# Patient Record
Sex: MALE | HISPANIC OR LATINO | Employment: FULL TIME | URBAN - METROPOLITAN AREA
[De-identification: names, ages, dates, MRNs, and addresses within clinical notes are randomized per-mention and may not be internally consistent; named-entity substitution may affect disease eponyms.]

---

## 2020-12-09 RX ORDER — LOVASTATIN 20 MG/1
20 TABLET ORAL NIGHTLY
Qty: 30 TABLET | Refills: 1 | Status: SHIPPED | OUTPATIENT
Start: 2020-12-09 | End: 2021-03-09 | Stop reason: SDUPTHER

## 2020-12-09 RX ORDER — LISINOPRIL 2.5 MG/1
2.5 TABLET ORAL DAILY
Qty: 30 TABLET | Refills: 1 | Status: SHIPPED | OUTPATIENT
Start: 2020-12-09 | End: 2021-03-09 | Stop reason: SDUPTHER

## 2020-12-09 RX ORDER — CANAGLIFLOZIN 300 MG/1
1 TABLET, FILM COATED ORAL DAILY
COMMUNITY
Start: 2020-10-18 | End: 2020-12-09 | Stop reason: SDUPTHER

## 2020-12-09 RX ORDER — CANAGLIFLOZIN 300 MG/1
1 TABLET, FILM COATED ORAL DAILY
Qty: 30 TABLET | Refills: 1 | Status: SHIPPED | OUTPATIENT
Start: 2020-12-09 | End: 2021-03-09 | Stop reason: SDUPTHER

## 2020-12-09 RX ORDER — GLIPIZIDE 5 MG/1
5 TABLET ORAL 2 TIMES DAILY
Qty: 60 TABLET | Refills: 1 | Status: SHIPPED | OUTPATIENT
Start: 2020-12-09 | End: 2021-03-09 | Stop reason: SDUPTHER

## 2020-12-09 NOTE — TELEPHONE ENCOUNTER
PT'S WIFE CALLED REQUESTING THAT WE SEND IN LOSINOPRIL, LOVASTSTIN, GLIPIZIDE, METFORMIN, INVOKANA IN TO [facility] [address] IN [place]. PLEASE AND THANK YOU

## 2021-03-05 RX ORDER — LOVASTATIN 20 MG/1
TABLET ORAL
Qty: 30 TABLET | Refills: 0 | OUTPATIENT
Start: 2021-03-05

## 2021-03-05 RX ORDER — LISINOPRIL 2.5 MG/1
TABLET ORAL
Qty: 30 TABLET | Refills: 0 | OUTPATIENT
Start: 2021-03-05

## 2021-03-05 RX ORDER — GLIPIZIDE 5 MG/1
TABLET ORAL
Qty: 60 TABLET | Refills: 0 | OUTPATIENT
Start: 2021-03-05

## 2021-03-09 RX ORDER — LISINOPRIL 2.5 MG/1
2.5 TABLET ORAL DAILY
Qty: 30 TABLET | Refills: 1 | Status: SHIPPED | OUTPATIENT
Start: 2021-03-09 | End: 2021-05-17 | Stop reason: SDUPTHER

## 2021-03-09 RX ORDER — GLIPIZIDE 5 MG/1
5 TABLET ORAL 2 TIMES DAILY
Qty: 60 TABLET | Refills: 1 | Status: SHIPPED | OUTPATIENT
Start: 2021-03-09 | End: 2021-05-17 | Stop reason: SDUPTHER

## 2021-03-09 RX ORDER — LOVASTATIN 20 MG/1
20 TABLET ORAL NIGHTLY
Qty: 30 TABLET | Refills: 1 | Status: SHIPPED | OUTPATIENT
Start: 2021-03-09 | End: 2021-05-17 | Stop reason: SDUPTHER

## 2021-03-09 RX ORDER — CANAGLIFLOZIN 300 MG/1
1 TABLET, FILM COATED ORAL DAILY
Qty: 30 TABLET | Refills: 1 | Status: SHIPPED | OUTPATIENT
Start: 2021-03-09 | End: 2021-05-17 | Stop reason: SDUPTHER

## 2021-05-17 ENCOUNTER — OFFICE VISIT (OUTPATIENT)
Dept: ENDOCRINOLOGY | Facility: CLINIC | Age: 46
End: 2021-05-17

## 2021-05-17 ENCOUNTER — LAB (OUTPATIENT)
Dept: LAB | Facility: HOSPITAL | Age: 46
End: 2021-05-17

## 2021-05-17 VITALS
HEIGHT: 66 IN | WEIGHT: 189.8 LBS | DIASTOLIC BLOOD PRESSURE: 70 MMHG | BODY MASS INDEX: 30.5 KG/M2 | TEMPERATURE: 97.1 F | SYSTOLIC BLOOD PRESSURE: 120 MMHG | OXYGEN SATURATION: 100 % | HEART RATE: 57 BPM

## 2021-05-17 DIAGNOSIS — R74.8 ELEVATED LIVER ENZYMES: ICD-10-CM

## 2021-05-17 DIAGNOSIS — E78.2 MIXED HYPERLIPIDEMIA: ICD-10-CM

## 2021-05-17 DIAGNOSIS — E11.9 TYPE 2 DIABETES MELLITUS WITHOUT COMPLICATION, WITHOUT LONG-TERM CURRENT USE OF INSULIN (HCC): Primary | ICD-10-CM

## 2021-05-17 LAB
EXPIRATION DATE: NORMAL
HBA1C MFR BLD: 7 %
Lab: NORMAL

## 2021-05-17 PROCEDURE — 83036 HEMOGLOBIN GLYCOSYLATED A1C: CPT | Performed by: PHYSICIAN ASSISTANT

## 2021-05-17 PROCEDURE — 99214 OFFICE O/P EST MOD 30 MIN: CPT | Performed by: PHYSICIAN ASSISTANT

## 2021-05-17 RX ORDER — BLOOD SUGAR DIAGNOSTIC
STRIP MISCELLANEOUS
Qty: 100 EACH | Refills: 3 | Status: SHIPPED | OUTPATIENT
Start: 2021-05-17 | End: 2022-05-16 | Stop reason: SDUPTHER

## 2021-05-17 RX ORDER — LISINOPRIL 2.5 MG/1
2.5 TABLET ORAL DAILY
Qty: 90 TABLET | Refills: 2 | Status: SHIPPED | OUTPATIENT
Start: 2021-05-17 | End: 2021-11-18 | Stop reason: SDUPTHER

## 2021-05-17 RX ORDER — GLIPIZIDE 5 MG/1
5 TABLET ORAL 2 TIMES DAILY
Qty: 180 TABLET | Refills: 2 | Status: SHIPPED | OUTPATIENT
Start: 2021-05-17 | End: 2021-11-18 | Stop reason: SDUPTHER

## 2021-05-17 RX ORDER — CANAGLIFLOZIN 300 MG/1
1 TABLET, FILM COATED ORAL DAILY
Qty: 90 TABLET | Refills: 2 | Status: SHIPPED | OUTPATIENT
Start: 2021-05-17 | End: 2021-11-18 | Stop reason: SDUPTHER

## 2021-05-17 RX ORDER — LOVASTATIN 20 MG/1
20 TABLET ORAL NIGHTLY
Qty: 90 TABLET | Refills: 2 | Status: SHIPPED | OUTPATIENT
Start: 2021-05-17 | End: 2021-11-18 | Stop reason: SDUPTHER

## 2021-05-17 NOTE — PROGRESS NOTES
"     Office Note      Date: 2021  Patient Name: Thony Lilly  MRN: [id number]  : 1975    Chief Complaint   Patient presents with   • Diabetes       History of Present Illness:   Thony Lilly is a 45 y.o. male who presents for type 2 diabetes.  He has been over a year since his last appointment.  He reports he has been staying home due to the pandemic.  He remains on Invokana 300 mg daily, glipizide 5 mg twice a day and Metformin at 1000 mg twice a day.  He checks his blood sugar a few times a week.  He reports he has had a few low blood sugars but none severe and these typically occur if he skips a meal.  He reports he is due for his eye exam this summer.  He had both doses of his Covid vaccine.  He denies any trouble with his feet today.  He is fasting today and is due for labs.  He remains on lovastatin 20 mg daily and lisinopril 2.5 mg daily.      Subjective     Review of Systems:   Review of Systems   Constitutional: Negative.    Cardiovascular: Negative.    Gastrointestinal: Negative.    Endocrine: Negative.    Neurological: Negative.        The following portions of the patient's history were reviewed and updated as appropriate: allergies, current medications, past family history, past medical history, past social history, past surgical history and problem list.    Objective     Vitals:    21 1025   BP: 120/70   Pulse: 57   Temp: 97.1 °F (36.2 °C)   TempSrc: Infrared   SpO2: 100%   Weight: 86.1 kg (189 lb 12.8 oz)   Height: 167.6 cm (66\")   PainSc: 0-No pain     Body mass index is 30.63 kg/m².    Physical Exam  Vitals reviewed.   Constitutional:       General: He is not in acute distress.     Appearance: Normal appearance.   Cardiovascular:      Pulses:           Dorsalis pedis pulses are 2+ on the right side and 2+ on the left side.        Posterior tibial pulses are 2+ on the right side and 2+ on the left side.   Musculoskeletal:      Right foot: No deformity.      Left foot: No " deformity.   Feet:      Right foot:      Protective Sensation: 5 sites tested. 5 sites sensed.      Skin integrity: Skin integrity normal.      Toenail Condition: Right toenails are normal.      Left foot:      Protective Sensation: 5 sites tested. 5 sites sensed.      Skin integrity: Skin integrity normal.      Toenail Condition: Left toenails are normal.      Comments: Diabetic Foot Exam Performed and Monofilament Test Performed    Neurological:      Mental Status: He is alert.         HEMOGLOBIN A1C  Lab Results   Component Value Date    HGBA1C 7.0 05/17/2021         Assessment / Plan      Assessment & Plan:  1. Type 2 diabetes mellitus without complication, without long-term current use of insulin (CMS/formerly [facility])  A1c today was 7.0%.  This is to goal.  He will continue his Invokana 300 mg daily, glipizide 5 mg twice a day and Metformin at 1000 mg twice a day.  I refilled these medications today.  Encouraged healthy eating habits and physical activity as tolerated.  His blood pressure is okay today.  His weight is down 4 pounds since we last saw him September 2019.  Encouraged him to get his eye appointment scheduled for the summer.  His foot exam was okay today.  Fasting labs pending.  Will send note with results and plan.  Patient to call as needed  - POC Glycosylated Hemoglobin (Hb A1C)  - Comprehensive Metabolic Panel; Future  - Lipid Panel; Future  - Microalbumin / Creatinine Urine Ratio - Urine, Clean Catch; Future  - TSH; Future  - TSH  - Microalbumin / Creatinine Urine Ratio - Urine, Clean Catch  - Lipid Panel  - Comprehensive Metabolic Panel    2. Mixed hyperlipidemia  Fasting labs pending.  Will send note with results and plan.  For now he will continue lovastatin 20 mg daily.  I have refilled this prescription.  - Lipid Panel; Future  - Lipid Panel    3. Elevated liver enzymes  He has a history of elevated liver enzymes and was seen by GI in the past and was diagnosed with fatty liver.  Will send note with  results and plan.  Encouraged healthy eating habits and physical activity as tolerated.  - Comprehensive Metabolic Panel; Future  - Comprehensive Metabolic Panel      Return in about 6 months (around 11/17/2021) for Recheck.     Katiana Sainz PA-C  05/17/2021

## 2021-05-18 LAB
ALBUMIN SERPL-MCNC: 4.8 G/DL (ref 4–5)
ALBUMIN/CREAT UR: 12 MG/G CREAT (ref 0–29)
ALBUMIN/GLOB SERPL: 1.7 {RATIO} (ref 1.2–2.2)
ALP SERPL-CCNC: 89 IU/L (ref 48–121)
ALT SERPL-CCNC: 61 IU/L (ref 0–44)
AST SERPL-CCNC: 31 IU/L (ref 0–40)
BILIRUB SERPL-MCNC: 0.3 MG/DL (ref 0–1.2)
BUN SERPL-MCNC: 24 MG/DL (ref 6–24)
BUN/CREAT SERPL: 34 (ref 9–20)
CALCIUM SERPL-MCNC: 9.4 MG/DL (ref 8.7–10.2)
CHLORIDE SERPL-SCNC: 105 MMOL/L (ref 96–106)
CHOLEST SERPL-MCNC: 126 MG/DL (ref 100–199)
CO2 SERPL-SCNC: 23 MMOL/L (ref 20–29)
CREAT SERPL-MCNC: 0.71 MG/DL (ref 0.76–1.27)
CREAT UR-MCNC: 166.6 MG/DL
GLOBULIN SER CALC-MCNC: 2.8 G/DL (ref 1.5–4.5)
GLUCOSE SERPL-MCNC: ABNORMAL MG/DL
HDLC SERPL-MCNC: 37 MG/DL
LDLC SERPL CALC-MCNC: 69 MG/DL (ref 0–99)
MICROALBUMIN UR-MCNC: 19.7 UG/ML
POTASSIUM SERPL-SCNC: ABNORMAL MMOL/L
PROT SERPL-MCNC: 7.6 G/DL (ref 6–8.5)
SODIUM SERPL-SCNC: 141 MMOL/L (ref 134–144)
TRIGL SERPL-MCNC: 105 MG/DL (ref 0–149)
TSH SERPL DL<=0.005 MIU/L-ACNC: 0.94 UIU/ML (ref 0.45–4.5)
VLDLC SERPL CALC-MCNC: 20 MG/DL (ref 5–40)

## 2021-11-18 ENCOUNTER — OFFICE VISIT (OUTPATIENT)
Dept: ENDOCRINOLOGY | Facility: CLINIC | Age: 46
End: 2021-11-18

## 2021-11-18 VITALS
HEIGHT: 66 IN | WEIGHT: 190.8 LBS | DIASTOLIC BLOOD PRESSURE: 70 MMHG | BODY MASS INDEX: 30.67 KG/M2 | SYSTOLIC BLOOD PRESSURE: 122 MMHG

## 2021-11-18 DIAGNOSIS — E78.2 MIXED HYPERLIPIDEMIA: ICD-10-CM

## 2021-11-18 DIAGNOSIS — E11.65 TYPE 2 DIABETES MELLITUS WITH HYPERGLYCEMIA, WITHOUT LONG-TERM CURRENT USE OF INSULIN (HCC): Primary | ICD-10-CM

## 2021-11-18 DIAGNOSIS — E11.649 TYPE 2 DIABETES MELLITUS WITH HYPOGLYCEMIA WITHOUT COMA, WITHOUT LONG-TERM CURRENT USE OF INSULIN (HCC): ICD-10-CM

## 2021-11-18 DIAGNOSIS — R74.8 ABNORMAL LIVER ENZYMES: ICD-10-CM

## 2021-11-18 LAB
EXPIRATION DATE: ABNORMAL
EXPIRATION DATE: NORMAL
GLUCOSE BLDC GLUCOMTR-MCNC: 133 MG/DL (ref 70–130)
HBA1C MFR BLD: 7.9 %
Lab: ABNORMAL
Lab: NORMAL

## 2021-11-18 PROCEDURE — 3051F HG A1C>EQUAL 7.0%<8.0%: CPT | Performed by: PHYSICIAN ASSISTANT

## 2021-11-18 PROCEDURE — 82947 ASSAY GLUCOSE BLOOD QUANT: CPT | Performed by: PHYSICIAN ASSISTANT

## 2021-11-18 PROCEDURE — 83036 HEMOGLOBIN GLYCOSYLATED A1C: CPT | Performed by: PHYSICIAN ASSISTANT

## 2021-11-18 PROCEDURE — 99214 OFFICE O/P EST MOD 30 MIN: CPT | Performed by: PHYSICIAN ASSISTANT

## 2021-11-18 RX ORDER — GLIPIZIDE 5 MG/1
5 TABLET ORAL 2 TIMES DAILY
Qty: 180 TABLET | Refills: 2 | Status: SHIPPED | OUTPATIENT
Start: 2021-11-18 | End: 2022-05-16 | Stop reason: SDUPTHER

## 2021-11-18 RX ORDER — LOVASTATIN 20 MG/1
20 TABLET ORAL NIGHTLY
Qty: 90 TABLET | Refills: 2 | Status: SHIPPED | OUTPATIENT
Start: 2021-11-18 | End: 2022-05-16 | Stop reason: SDUPTHER

## 2021-11-18 RX ORDER — CANAGLIFLOZIN 300 MG/1
1 TABLET, FILM COATED ORAL DAILY
Qty: 90 TABLET | Refills: 2 | Status: SHIPPED | OUTPATIENT
Start: 2021-11-18 | End: 2022-05-16 | Stop reason: SDUPTHER

## 2021-11-18 RX ORDER — LISINOPRIL 2.5 MG/1
2.5 TABLET ORAL DAILY
Qty: 90 TABLET | Refills: 2 | Status: SHIPPED | OUTPATIENT
Start: 2021-11-18 | End: 2022-05-16 | Stop reason: SDUPTHER

## 2021-11-18 NOTE — PROGRESS NOTES
"     Office Note      Date: 2021  Patient Name: Thony Lilly  MRN: [id number]  : 1975    Chief Complaint   Patient presents with   • Diabetes       History of Present Illness:   Thony Lilly is a 46 y.o. male who presents for follow up for Type 2 Diabetes.  He is accompanied by his wife today.  He reports he is not surprised his A1c is up some.  He has been missing some doses of his medications.  He is working some from home in some form in the office and is off his routine.  He reports he has also had a few low blood sugars when he goes into the office and worries about taking his medications before he goes in.  He reports he is up-to-date on his eye exam he had this completed in the summer.  He reports he has had his Covid booster and plans to get his flu vaccine in the next few weeks.  He denies any trouble with his feet today we did his foot exam at his appointment in May.        Subjective     Review of Systems:   Review of Systems   Constitutional: Negative.    Cardiovascular: Negative.    Gastrointestinal: Negative.    Endocrine: Negative.    Neurological: Negative.        The following portions of the patient's history were reviewed and updated as appropriate: allergies, current medications, past family history, past medical history, past social history, past surgical history and problem list.    Objective     Vitals:    21 1057   BP: 122/70   BP Location: Left arm   Patient Position: Sitting   Cuff Size: Adult   Weight: 86.5 kg (190 lb 12.8 oz)   Height: 167.6 cm (66\")     Body mass index is 30.8 kg/m².    Physical Exam  Vitals reviewed.   Constitutional:       General: He is not in acute distress.     Appearance: Normal appearance.   Neurological:      Mental Status: He is alert.         HEMOGLOBIN A1C  Lab Results   Component Value Date    HGBA1C 7.9 2021       GLUCOSE  Glucose   Date Value Ref Range Status   2021 133 (A) 70 - 130 mg/dL Final       CMP  Lab Results "   Component Value Date    GLUCOSE CANCELED 05/17/2021    BUN 24 05/17/2021    CREATININE 0.71 (L) 05/17/2021    EGFRIFNONA 113 05/17/2021    EGFRIFAFRI 131 05/17/2021    BCR 34 (H) 05/17/2021    K CANCELED 05/17/2021    CO2 23 05/17/2021    CALCIUM 9.4 05/17/2021    PROTENTOTREF 7.6 05/17/2021    LABIL2 1.7 05/17/2021    AST 31 05/17/2021    ALT 61 (H) 05/17/2021       LIPID PANEL  Lab Results   Component Value Date    CHLPL 126 05/17/2021    TRIG 105 05/17/2021    HDL 37 (L) 05/17/2021    LDL 69 05/17/2021       URINE MICROALBUMIN/CREATININE RATIO  Microalbumin/Creatinine Ratio   Date Value Ref Range Status   05/17/2021 12 0 - 29 mg/g creat Final     Comment:                            Normal:                0 -  29                         Moderately increased: 30 - 300                         Severely increased:       >300         TSH  Lab Results   Component Value Date    TSH 0.940 05/17/2021       Assessment / Plan      Assessment & Plan:  1. Type 2 diabetes mellitus with hyperglycemia, without long-term current use of insulin (HCC)  His A1c today is up some as compared to his appointment in May at 7.9%.  This is above goal.  We discussed the importance of taking his medications regularly.  We discussed that Invokana and Metformin typically do not cause hypoglycemia and he will hold his glipizide in the mornings he goes into the office to prevent low blood sugars.  He will continue Invokana 300 mg daily, Metformin at 1000 mg twice a day and glipizide 5 mg twice a day unless he is in the office he will only take this in the evenings.  His weight is stable  His blood pressures okay today.  I encouraged healthy eating habits and physical activity as tolerated.  - POC Glucose, Blood  - POC Glycosylated Hemoglobin (Hb A1C)    2. Type 2 diabetes mellitus with hypoglycemia without coma, without long-term current use of insulin (HCC)  He has had a few low blood sugars when he goes into the office and takes all his  medications.  On the days he goes into the office and is more active he will hold his glipizide in the morning.  I encouraged him to send in blood sugar readings for review and adjustment as needed.    3. Mixed hyperlipidemia  Fasting labs okay last appointment.  He will continue the lovastatin 20 mg daily.  We will plan to check his fasting labs at his follow-up appointment for monitoring.    4. Abnormal liver enzymes  His ALT was mildly elevated on labs in May but had improved.  We will continue to monitor this regularly.      Return in about 6 months (around 5/18/2022) for Recheck, fasting next appt.     This note was dictated using Dragon voice recognition.    Katiana Sainz PA-C  11/18/2021

## 2022-05-16 ENCOUNTER — OFFICE VISIT (OUTPATIENT)
Dept: ENDOCRINOLOGY | Facility: CLINIC | Age: 47
End: 2022-05-16

## 2022-05-16 ENCOUNTER — LAB (OUTPATIENT)
Dept: LAB | Facility: HOSPITAL | Age: 47
End: 2022-05-16

## 2022-05-16 VITALS
HEART RATE: 68 BPM | BODY MASS INDEX: 28.93 KG/M2 | WEIGHT: 180 LBS | HEIGHT: 66 IN | OXYGEN SATURATION: 99 % | DIASTOLIC BLOOD PRESSURE: 73 MMHG | SYSTOLIC BLOOD PRESSURE: 116 MMHG

## 2022-05-16 DIAGNOSIS — E78.2 MIXED HYPERLIPIDEMIA: ICD-10-CM

## 2022-05-16 DIAGNOSIS — E11.65 TYPE 2 DIABETES MELLITUS WITH HYPERGLYCEMIA, WITHOUT LONG-TERM CURRENT USE OF INSULIN: Primary | ICD-10-CM

## 2022-05-16 DIAGNOSIS — R74.8 ELEVATED LIVER ENZYMES: ICD-10-CM

## 2022-05-16 LAB
EXPIRATION DATE: ABNORMAL
EXPIRATION DATE: NORMAL
GLUCOSE BLDC GLUCOMTR-MCNC: 155 MG/DL (ref 70–130)
HBA1C MFR BLD: 7 %
Lab: ABNORMAL
Lab: NORMAL

## 2022-05-16 PROCEDURE — 99214 OFFICE O/P EST MOD 30 MIN: CPT | Performed by: PHYSICIAN ASSISTANT

## 2022-05-16 PROCEDURE — 82947 ASSAY GLUCOSE BLOOD QUANT: CPT | Performed by: PHYSICIAN ASSISTANT

## 2022-05-16 PROCEDURE — 83036 HEMOGLOBIN GLYCOSYLATED A1C: CPT | Performed by: PHYSICIAN ASSISTANT

## 2022-05-16 RX ORDER — BLOOD SUGAR DIAGNOSTIC
STRIP MISCELLANEOUS
Qty: 100 EACH | Refills: 3 | Status: SHIPPED | OUTPATIENT
Start: 2022-05-16 | End: 2022-10-04 | Stop reason: SDUPTHER

## 2022-05-16 RX ORDER — LISINOPRIL 2.5 MG/1
2.5 TABLET ORAL DAILY
Qty: 90 TABLET | Refills: 2 | Status: SHIPPED | OUTPATIENT
Start: 2022-05-16 | End: 2022-10-04 | Stop reason: SDUPTHER

## 2022-05-16 RX ORDER — LOVASTATIN 20 MG/1
20 TABLET ORAL NIGHTLY
Qty: 90 TABLET | Refills: 2 | Status: SHIPPED | OUTPATIENT
Start: 2022-05-16 | End: 2022-10-04 | Stop reason: SDUPTHER

## 2022-05-16 RX ORDER — GLIPIZIDE 5 MG/1
5 TABLET ORAL 2 TIMES DAILY
Qty: 180 TABLET | Refills: 2 | Status: SHIPPED | OUTPATIENT
Start: 2022-05-16 | End: 2022-10-04 | Stop reason: SDUPTHER

## 2022-05-16 RX ORDER — CANAGLIFLOZIN 300 MG/1
1 TABLET, FILM COATED ORAL DAILY
Qty: 90 TABLET | Refills: 2 | Status: SHIPPED | OUTPATIENT
Start: 2022-05-16 | End: 2022-10-04 | Stop reason: SDUPTHER

## 2022-05-16 NOTE — PROGRESS NOTES
"     Office Note      Date: 2022  Patient Name: Thony Lilly  MRN: [id number]  : 1975    Chief Complaint   Patient presents with   • Diabetes       History of Present Illness:   Thony Lilly is a 46 y.o. male who presents for follow-up for type 2 diabetes.  He remains on glipizide 5 mg 2 tablets daily, Invokana 300 mg daily and metformin 1000 mg twice a day.  He denies any trouble with hypoglycemia.  He reports overall his blood sugars have been okay.  He is trying to monitor his diet and stay active.  He reports he will be due for his annual eye exam later this summer.  He is fasting today for labs and denies any trouble with his feet today.  He reports he had the flu about 4 weeks ago and has noted trouble feeling dizzy with head movement since this time.        Subjective     Review of Systems:   Review of Systems   Constitutional: Negative.    Cardiovascular: Negative.    Gastrointestinal: Negative.    Endocrine: Negative.    Neurological: Positive for dizziness.       The following portions of the patient's history were reviewed and updated as appropriate: allergies, current medications, past family history, past medical history, past social history, past surgical history and problem list.    Objective     Vitals:    22 1025   BP: 116/73   Pulse: 68   SpO2: 99%   Weight: 81.6 kg (180 lb)   Height: 167.6 cm (66\")     Body mass index is 29.05 kg/m².    Physical Exam  Vitals reviewed.   Constitutional:       General: He is not in acute distress.     Appearance: Normal appearance.   Cardiovascular:      Pulses:           Dorsalis pedis pulses are 2+ on the right side and 2+ on the left side.        Posterior tibial pulses are 2+ on the right side and 2+ on the left side.   Musculoskeletal:      Right foot: No deformity.      Left foot: No deformity.   Feet:      Right foot:      Protective Sensation: 5 sites tested. 5 sites sensed.      Skin integrity: Skin integrity normal.      Toenail " Condition: Right toenails are normal.      Left foot:      Protective Sensation: 5 sites tested. 5 sites sensed.      Skin integrity: Skin integrity normal.      Toenail Condition: Left toenails are normal.      Comments: Diabetic Foot Exam Performed and Monofilament Test Performed    Neurological:      Mental Status: He is alert.         HEMOGLOBIN A1C  Lab Results   Component Value Date    HGBA1C 7.0 05/16/2022       GLUCOSE  Glucose   Date Value Ref Range Status   05/16/2022 155 (A) 70 - 130 mg/dL Final       CMP  Lab Results   Component Value Date    GLUCOSE CANCELED 05/17/2021    BUN 24 05/17/2021    CREATININE 0.71 (L) 05/17/2021    EGFRIFNONA 113 05/17/2021    EGFRIFAFRI 131 05/17/2021    BCR 34 (H) 05/17/2021    K CANCELED 05/17/2021    CO2 23 05/17/2021    CALCIUM 9.4 05/17/2021    PROTENTOTREF 7.6 05/17/2021    LABIL2 1.7 05/17/2021    AST 31 05/17/2021    ALT 61 (H) 05/17/2021       LIPID PANEL  Lab Results   Component Value Date    CHLPL 126 05/17/2021    TRIG 105 05/17/2021    HDL 37 (L) 05/17/2021    LDL 69 05/17/2021       URINE MICROALBUMIN/CREATININE RATIO  Microalbumin/Creatinine Ratio   Date Value Ref Range Status   05/17/2021 12 0 - 29 mg/g creat Final     Comment:                            Normal:                0 -  29                         Moderately increased: 30 - 300                         Severely increased:       >300         TSH  Lab Results   Component Value Date    TSH 0.940 05/17/2021       Assessment / Plan      Assessment & Plan:  1. Type 2 diabetes mellitus with hyperglycemia, without long-term current use of insulin (HCC)  His A1c has improved as compared to November at 7.0%.  For now we will continue glipizide 5 mg 2 tablets daily, Invokana 300 mg daily and metformin 1000 mg twice a day.  I refilled these prescriptions today.  His blood pressure is okay today.  His weight is down 11 pounds since his appointment in November.  I encouraged continued healthy eating habits and  physical activity as tolerated.  His foot exam was okay today.  Fasting labs pending today.  Will send note with results and plan.  I encouraged him to check in with his primary care physician regarding the dizzy spells with head movement.  - POC Glucose, Blood  - POC Glycosylated Hemoglobin (Hb A1C)  - Comprehensive Metabolic Panel; Future  - Lipid Panel; Future  - Microalbumin / Creatinine Urine Ratio - Urine, Clean Catch; Future  - TSH; Future    2. Mixed hyperlipidemia  Sting labs pending.  Will send note with results and plan.  For now he will continue the lovastatin 20 mg daily.  I refilled this prescription today.  - Lipid Panel; Future    3. Elevated liver enzymes  CMP pending today.  Will send note with results and plan.  Patient to call as needed.  - Comprehensive Metabolic Panel; Future      Return in about 3 months (around 8/16/2022) for Recheck 3-4 mos.     This note was dictated using Dragon voice recognition.    Katiana Sainz PA-C  05/16/2022

## 2022-05-17 ENCOUNTER — TELEPHONE (OUTPATIENT)
Dept: ENDOCRINOLOGY | Facility: CLINIC | Age: 47
End: 2022-05-17

## 2022-05-17 LAB
ALBUMIN SERPL-MCNC: 4.5 G/DL (ref 3.5–5.2)
ALBUMIN/CREAT UR: 19 MG/G CREAT (ref 0–29)
ALBUMIN/GLOB SERPL: 1.3 G/DL
ALP SERPL-CCNC: 105 U/L (ref 39–117)
ALT SERPL-CCNC: 91 U/L (ref 1–41)
AST SERPL-CCNC: 43 U/L (ref 1–40)
BILIRUB SERPL-MCNC: 0.2 MG/DL (ref 0–1.2)
BUN SERPL-MCNC: 19 MG/DL (ref 6–20)
BUN/CREAT SERPL: 26.4 (ref 7–25)
CALCIUM SERPL-MCNC: 9.7 MG/DL (ref 8.6–10.5)
CHLORIDE SERPL-SCNC: 103 MMOL/L (ref 98–107)
CHOLEST SERPL-MCNC: 124 MG/DL (ref 0–200)
CO2 SERPL-SCNC: 22.6 MMOL/L (ref 22–29)
CREAT SERPL-MCNC: 0.72 MG/DL (ref 0.76–1.27)
CREAT UR-MCNC: 85.6 MG/DL
EGFRCR SERPLBLD CKD-EPI 2021: 114.1 ML/MIN/1.73
GLOBULIN SER CALC-MCNC: 3.6 GM/DL
GLUCOSE SERPL-MCNC: 145 MG/DL (ref 65–99)
HDLC SERPL-MCNC: 36 MG/DL (ref 40–60)
LDLC SERPL CALC-MCNC: 75 MG/DL (ref 0–100)
MICROALBUMIN UR-MCNC: 16.2 UG/ML
POTASSIUM SERPL-SCNC: 5.1 MMOL/L (ref 3.5–5.2)
PROT SERPL-MCNC: 8.1 G/DL (ref 6–8.5)
SODIUM SERPL-SCNC: 136 MMOL/L (ref 136–145)
TRIGL SERPL-MCNC: 61 MG/DL (ref 0–150)
TSH SERPL DL<=0.005 MIU/L-ACNC: 1.25 UIU/ML (ref 0.27–4.2)
VLDLC SERPL CALC-MCNC: 13 MG/DL (ref 5–40)

## 2022-10-04 ENCOUNTER — OFFICE VISIT (OUTPATIENT)
Dept: ENDOCRINOLOGY | Facility: CLINIC | Age: 47
End: 2022-10-04

## 2022-10-04 VITALS
DIASTOLIC BLOOD PRESSURE: 77 MMHG | SYSTOLIC BLOOD PRESSURE: 122 MMHG | WEIGHT: 189 LBS | HEIGHT: 66 IN | OXYGEN SATURATION: 99 % | HEART RATE: 86 BPM | BODY MASS INDEX: 30.37 KG/M2

## 2022-10-04 DIAGNOSIS — E11.65 TYPE 2 DIABETES MELLITUS WITH HYPERGLYCEMIA, WITHOUT LONG-TERM CURRENT USE OF INSULIN: Primary | ICD-10-CM

## 2022-10-04 DIAGNOSIS — R74.8 ABNORMAL LIVER ENZYMES: ICD-10-CM

## 2022-10-04 DIAGNOSIS — E11.649 TYPE 2 DIABETES MELLITUS WITH HYPOGLYCEMIA WITHOUT COMA, WITHOUT LONG-TERM CURRENT USE OF INSULIN: ICD-10-CM

## 2022-10-04 LAB
EXPIRATION DATE: ABNORMAL
EXPIRATION DATE: NORMAL
GLUCOSE BLDC GLUCOMTR-MCNC: 69 MG/DL (ref 70–130)
HBA1C MFR BLD: 7.6 %
Lab: ABNORMAL
Lab: NORMAL

## 2022-10-04 PROCEDURE — 82947 ASSAY GLUCOSE BLOOD QUANT: CPT | Performed by: PHYSICIAN ASSISTANT

## 2022-10-04 PROCEDURE — 99214 OFFICE O/P EST MOD 30 MIN: CPT | Performed by: PHYSICIAN ASSISTANT

## 2022-10-04 PROCEDURE — 83036 HEMOGLOBIN GLYCOSYLATED A1C: CPT | Performed by: PHYSICIAN ASSISTANT

## 2022-10-04 RX ORDER — GLIPIZIDE 5 MG/1
5 TABLET ORAL 2 TIMES DAILY
Qty: 180 TABLET | Refills: 2 | Status: SHIPPED | OUTPATIENT
Start: 2022-10-04 | End: 2023-01-30 | Stop reason: SDUPTHER

## 2022-10-04 RX ORDER — LOVASTATIN 20 MG/1
20 TABLET ORAL NIGHTLY
Qty: 90 TABLET | Refills: 2 | Status: SHIPPED | OUTPATIENT
Start: 2022-10-04 | End: 2023-01-30 | Stop reason: SDUPTHER

## 2022-10-04 RX ORDER — LISINOPRIL 2.5 MG/1
2.5 TABLET ORAL DAILY
Qty: 90 TABLET | Refills: 2 | Status: SHIPPED | OUTPATIENT
Start: 2022-10-04 | End: 2023-01-30 | Stop reason: SDUPTHER

## 2022-10-04 RX ORDER — CANAGLIFLOZIN 300 MG/1
1 TABLET, FILM COATED ORAL DAILY
Qty: 90 TABLET | Refills: 2 | Status: SHIPPED | OUTPATIENT
Start: 2022-10-04 | End: 2023-01-30 | Stop reason: SDUPTHER

## 2022-10-04 NOTE — PROGRESS NOTES
"     Office Note      Date: 10/04/2022  Patient Name: Thony Lilly  MRN: [id number]  : 1975    Chief Complaint   Patient presents with   • Diabetes       History of Present Illness:   Thony Lilly is a 47 y.o. male who presents for follow-up for type 2 diabetes.  He reports overall his blood sugars have been good.  He had COVID about a month ago and his blood sugars were elevated at this time.  He reports he only had a mild case and did not have to be hospitalized.  He remains on glipizide 5 mg 2 tablets daily,  Invokana 300 mg daily and metformin 1000 mg twice a day.  His blood glucose is low today because he had not had lunch yet but he denies severe or frequent hypoglycemia.  He recently had his eye exam and did have some mild retinopathy they discussed considering injections but advised him he was borderline.  He elected not to do the injections and plans to follow-up in a few months.  He denies any trouble with his feet today we did his foot exam as well as labs at his appointment in May.  He has a history of hepatitis and fluctuating liver enzymes.  He reports he has had a liver ultrasound in the past.      Subjective     Review of Systems:   Review of Systems   Constitutional: Negative.    Cardiovascular: Negative.    Gastrointestinal: Negative.    Endocrine: Negative.    Neurological: Negative.        The following portions of the patient's history were reviewed and updated as appropriate: allergies, current medications, past family history, past medical history, past social history, past surgical history and problem list.    Objective     Vitals:    10/04/22 1401   BP: 122/77   Pulse: 86   SpO2: 99%   Weight: 85.7 kg (189 lb)   Height: 167.6 cm (66\")     Body mass index is 30.51 kg/m².    Physical Exam  Vitals reviewed.   Constitutional:       General: He is not in acute distress.     Appearance: Normal appearance.   Neurological:      Mental Status: He is alert.         HEMOGLOBIN A1C  Lab " Results   Component Value Date    HGBA1C 7.6 10/04/2022       GLUCOSE  Glucose   Date Value Ref Range Status   10/04/2022 69 (A) 70 - 130 mg/dL Final       CMP  Lab Results   Component Value Date    GLUCOSE 145 (H) 05/16/2022    BUN 19 05/16/2022    CREATININE 0.72 (L) 05/16/2022    EGFRIFNONA 113 05/17/2021    EGFRIFAFRI 131 05/17/2021    BCR 26.4 (H) 05/16/2022    K 5.1 05/16/2022    CO2 22.6 05/16/2022    CALCIUM 9.7 05/16/2022    PROTENTOTREF 8.1 05/16/2022    LABIL2 1.3 05/16/2022    AST 43 (H) 05/16/2022    ALT 91 (H) 05/16/2022       LIPID PANEL  Lab Results   Component Value Date    CHLPL 124 05/16/2022    TRIG 61 05/16/2022    HDL 36 (L) 05/16/2022    LDL 75 05/16/2022       URINE MICROALBUMIN/CREATININE RATIO  Microalbumin/Creatinine Ratio   Date Value Ref Range Status   05/16/2022 19 0 - 29 mg/g creat Final     Comment:                            Normal:                0 -  29                         Moderately increased: 30 - 300                         Severely increased:       >300         TSH  Lab Results   Component Value Date    TSH 1.250 05/16/2022       Assessment / Plan      Assessment & Plan:  1. Type 2 diabetes mellitus with hyperglycemia, without long-term current use of insulin (HCC)  His hemoglobin A1c is up some today at 7.6%.  This is above goal.  He reports his blood sugars have been okay recently.  For now we will continue glipizide 5 mg 2 tablets daily, Invokana 300 mg daily and metformin 1000 mg twice a day.  I encouraged healthy eating habits and physical activity as tolerated.  His blood pressure is okay today.  Based on her reported weight last visit his weight is up 9 pounds since his last visit but he reports his weight is stable he thinks the lab we have listed last visit was in error.  I refilled his diabetes medications today.  Patient to call as needed.  - POC Glucose, Blood  - POC Glycosylated Hemoglobin (Hb A1C)    2. Type 2 diabetes mellitus with hypoglycemia without coma,  without long-term current use of insulin (HCC)  His blood glucose was 69 mg/dL in the office today.  He had not eaten lunch today.  We gave him the juice and some crackers and before he was leaving the office to go eat lunch his blood glucose was 94 mg/dL and he was feeling fine.    3. Abnormal liver enzymes  His liver enzymes were elevated last visit.  He reports he has a history of hepatitis and has had a liver ultrasound in the past.  We will plan to recheck his CMP next visit for monitoring.      Return in about 3 months (around 1/4/2023) for Recheck 3-4 mos.     This note was dictated using Dragon voice recognition.    Katiana Sainz PA-C  10/04/2022

## 2023-01-30 ENCOUNTER — OFFICE VISIT (OUTPATIENT)
Dept: ENDOCRINOLOGY | Facility: CLINIC | Age: 48
End: 2023-01-30
Payer: COMMERCIAL

## 2023-01-30 VITALS
WEIGHT: 187 LBS | HEART RATE: 86 BPM | HEIGHT: 66 IN | DIASTOLIC BLOOD PRESSURE: 74 MMHG | SYSTOLIC BLOOD PRESSURE: 118 MMHG | BODY MASS INDEX: 30.05 KG/M2 | OXYGEN SATURATION: 99 %

## 2023-01-30 DIAGNOSIS — E11.65 TYPE 2 DIABETES MELLITUS WITH HYPERGLYCEMIA, WITHOUT LONG-TERM CURRENT USE OF INSULIN: Primary | ICD-10-CM

## 2023-01-30 DIAGNOSIS — R74.8 ABNORMAL LIVER ENZYMES: ICD-10-CM

## 2023-01-30 LAB
EXPIRATION DATE: NORMAL
EXPIRATION DATE: NORMAL
GLUCOSE BLDC GLUCOMTR-MCNC: 124 MG/DL (ref 70–130)
HBA1C MFR BLD: 9 %
Lab: NORMAL
Lab: NORMAL

## 2023-01-30 PROCEDURE — 82947 ASSAY GLUCOSE BLOOD QUANT: CPT | Performed by: PHYSICIAN ASSISTANT

## 2023-01-30 PROCEDURE — 99214 OFFICE O/P EST MOD 30 MIN: CPT | Performed by: PHYSICIAN ASSISTANT

## 2023-01-30 PROCEDURE — 83036 HEMOGLOBIN GLYCOSYLATED A1C: CPT | Performed by: PHYSICIAN ASSISTANT

## 2023-01-30 RX ORDER — GLIPIZIDE 5 MG/1
5 TABLET ORAL 2 TIMES DAILY
Qty: 180 TABLET | Refills: 2 | Status: SHIPPED | OUTPATIENT
Start: 2023-01-30

## 2023-01-30 RX ORDER — LISINOPRIL 2.5 MG/1
2.5 TABLET ORAL DAILY
Qty: 90 TABLET | Refills: 2 | Status: SHIPPED | OUTPATIENT
Start: 2023-01-30

## 2023-01-30 RX ORDER — CANAGLIFLOZIN 300 MG/1
1 TABLET, FILM COATED ORAL DAILY
Qty: 90 TABLET | Refills: 2 | Status: SHIPPED | OUTPATIENT
Start: 2023-01-30 | End: 2023-02-02

## 2023-01-30 RX ORDER — LOVASTATIN 20 MG/1
20 TABLET ORAL NIGHTLY
Qty: 90 TABLET | Refills: 2 | Status: SHIPPED | OUTPATIENT
Start: 2023-01-30

## 2023-01-30 NOTE — PROGRESS NOTES
"     Office Note      Date: 2023  Patient Name: Thony Lilly  MRN: [id number]  : 1975    Chief Complaint   Patient presents with   • Diabetes       History of Present Illness:   Thony Lilly is a 47 y.o. male who presents for follow-up for type 2 diabetes.  He remains on glipizide 5 mg 2 tablets daily, Invokana 300 mg daily and metformin 1000 mg twice a day.  He reports he has been to [place] twice since last visit and was there both times for an extended period.  He reports he does not eat like he should in [place] and often drink sugary drinks and eats with his family.  He reports he is not surprised his hemoglobin A1c is elevated.  He reports he is back on track at home and is eating better.  He denies any hypoglycemia.  He is currently up-to-date on his eye exam.  He denies any trouble with his feet today.  We will plan to do fasting labs as well as his foot exam at his follow-up appointment.  His liver enzymes were elevated last time these were checked and he does have a history of hepatitis.  We will plan to recheck these next visit.      Subjective     Review of Systems:   Review of Systems   Constitutional: Negative.    Cardiovascular: Negative.    Gastrointestinal: Negative.    Endocrine: Negative.    Neurological: Negative.        The following portions of the patient's history were reviewed and updated as appropriate: allergies, current medications, past family history, past medical history, past social history, past surgical history and problem list.    Objective     Vitals:    23 1347   BP: 118/74   Pulse: 86   SpO2: 99%   Weight: 84.8 kg (187 lb)   Height: 167.6 cm (66\")     Body mass index is 30.18 kg/m².    Physical Exam  Vitals reviewed.   Constitutional:       General: He is not in acute distress.     Appearance: Normal appearance.   Neurological:      Mental Status: He is alert.         HEMOGLOBIN A1C  Lab Results   Component Value Date    HGBA1C 9.0 2023 "       GLUCOSE  Glucose   Date Value Ref Range Status   01/30/2023 124 70 - 130 mg/dL Final       CMP  Lab Results   Component Value Date    GLUCOSE 145 (H) 05/16/2022    BUN 19 05/16/2022    CREATININE 0.72 (L) 05/16/2022    EGFRIFNONA 113 05/17/2021    EGFRIFAFRI 131 05/17/2021    BCR 26.4 (H) 05/16/2022    K 5.1 05/16/2022    CO2 22.6 05/16/2022    CALCIUM 9.7 05/16/2022    PROTENTOTREF 8.1 05/16/2022    LABIL2 1.3 05/16/2022    AST 43 (H) 05/16/2022    ALT 91 (H) 05/16/2022       LIPID PANEL  Lab Results   Component Value Date    CHLPL 124 05/16/2022    TRIG 61 05/16/2022    HDL 36 (L) 05/16/2022    LDL 75 05/16/2022       URINE MICROALBUMIN/CREATININE RATIO  Microalbumin/Creatinine Ratio   Date Value Ref Range Status   05/16/2022 19 0 - 29 mg/g creat Final     Comment:                            Normal:                0 -  29                         Moderately increased: 30 - 300                         Severely increased:       >300         TSH  Lab Results   Component Value Date    TSH 1.250 05/16/2022       Assessment / Plan      Assessment & Plan:  1. Type 2 diabetes mellitus with hyperglycemia, without long-term current use of insulin (HCC)  His hemoglobin A1c is well above goal and higher than it was in October at 9.0%.  He is not willing to consider injections but we did discuss adding Rybelsus.  He is willing to consider this medication but for now we will continue glipizide 5 mg 2 tablets daily, Invokana 300 mg daily and metformin 1000 mg twice a day.  He is back on track with healthier eating habits and his blood glucose is good today.  He will continue to monitor this and if these become elevated or he starts to note symptoms of hyperglycemia such as polyuria and polydipsia he will reach out to me and we will send a prescription for Rybelsus.  I gave him a handout on this today.  His blood pressure is okay today.  His weight is down 2 pounds since his appointment in October.  I encouraged continued  healthy eating habits and physical activity as tolerated.  I refilled his Invokana, glipizide and metformin today.  - POC Glucose, Blood  - POC Glycosylated Hemoglobin (Hb A1C)    2. Abnormal liver enzymes  His liver enzymes were elevated in May.  We discussed that with the increased blood glucose readings these will likely be elevated again today.  We will plan to recheck these next visit for monitoring fasting once he has his diabetes under better control.  We will plan to do fasting labs next visit.  Patient to call as needed.      Return in about 3 months (around 4/30/2023) for Recheck 3-4 mos fasting.     This note was dictated using Dragon voice recognition.    Katiana Sainz PA-C  01/30/2023

## 2023-02-01 ENCOUNTER — TELEPHONE (OUTPATIENT)
Dept: ENDOCRINOLOGY | Facility: CLINIC | Age: 48
End: 2023-02-01
Payer: COMMERCIAL

## 2023-02-01 NOTE — TELEPHONE ENCOUNTER
Invokana denied by insurance.  Denied on January 31:  We cover this drug when our criteria are met. The unmet criteria are: tried or cannot use an empagliflozin containing drug AND a dapagliflozin containing drug. This

## 2023-02-02 NOTE — TELEPHONE ENCOUNTER
Looks like he needs to try Jardiance-- I sent a new prescription for Jardiance 25mg daily.  Please make him aware this will replace invokana. Thanks,  RT

## 2023-02-03 ENCOUNTER — TELEPHONE (OUTPATIENT)
Dept: ENDOCRINOLOGY | Facility: CLINIC | Age: 48
End: 2023-02-03
Payer: COMMERCIAL

## 2023-02-03 NOTE — TELEPHONE ENCOUNTER
PATIENT'S SPOUSE IS REQUESTING TO SPEAK WITH CLINIC STAFF REGARDING A MEDICATION THAT WAS PRESCRIBED FOR PATIENT AT LAST OFFICE VISIT.     CALL BACK [phone] [place], SPOUSE

## 2023-02-03 NOTE — TELEPHONE ENCOUNTER
Spoke with patients wife.  She states that when she went to the pharmacy and  the Jardiance that it was over $1000 even with their insurance.  She is stating that they just got new insurance and wants to know if you have any other suggestions.

## 2023-02-06 RX ORDER — ERTUGLIFLOZIN 15 MG/1
15 TABLET, FILM COATED ORAL EVERY MORNING
Qty: 40 TABLET | Refills: 5 | Status: SHIPPED | OUTPATIENT
Start: 2023-02-06

## 2023-02-06 NOTE — TELEPHONE ENCOUNTER
Please let them know I spoke with the pharmacist in our office and he now has a high deductible drug plan so until the deductible is met they will have to pay the cost of the drug.  She suggested using Steglatro 15mg daily.  There copay card pays the most for the drug.  I have mailed the copay card the pharmacist printed to them and sent a prescription to the pharmacy.  They will have to give the copay card to the pharmacist to get the discount.  This should be the most reasonable SGLT-2 inhibitor with the new plan.  I also wrote the prescription for a 40 day supply with refills--- this is the most you can fill with the card and the number of fills is limited.Thanks RT

## 2023-02-22 ENCOUNTER — TELEPHONE (OUTPATIENT)
Dept: ENDOCRINOLOGY | Facility: CLINIC | Age: 48
End: 2023-02-22
Payer: COMMERCIAL

## 2023-02-23 NOTE — TELEPHONE ENCOUNTER
PA for Steglatro was denied. Patient must try and fail both Jardiance and Farxiga before it can be approved. Per coupon help desk, coupon can be used for patients with commercial insurance who have coverage for Steglatro or those who do not have coverage (steglatro is non-formulary).      Unfortunately Steglatro appears to be on the patient's formulary, it is just not preferred and requires step therapy with / failure of both Jardiance and Farxiga. Denial of the PA does not mean that the patient does not have coverage for the medication, only that certain criteria can be met before coverage/paid claim is permitted.     If in other words, Steglatro would have to be non-formulary or the PA would have to be approved for the coupon to work. If approved, the coupon would cover up to $583 per month and would leave the patient with a $0 co-pay.     Cost of Farxiga with coupon is $369.59 for one month.    Sheila Horan, PharmD, BCACP  Clinical Specialty Pharmacist, Endocrinology  2/23/2023  13:02 EST

## 2023-02-23 NOTE — TELEPHONE ENCOUNTER
Coupon can be used for insured patients with or without coverage, but PA must be completed before the coupon can be applied. Not sure if a PA had been submitted already, so I re-submitted today.     CoverMyMeds Key: [id number]  PA Case ID: [id number]    Once PA results, I will be able to run claim to see if coupon works. I will update pool/provider when I know more. Thank you.     Sheila Horan, PharmD, BCACP  Clinical Specialty Pharmacist, Endocrinology  2/23/2023  09:22 EST

## 2023-02-23 NOTE — TELEPHONE ENCOUNTER
So unfortunately, he has a high deductible plan and it sounds like the Steglatro will not work since it is on his formulary, but not preferred.  If he cannot afford to pay the deductible, we can stop the SGLT-2 inhibitor and if he has trouble with high blood glucose readings we can increase his glipizide.  Have him monitor these and let me know.  Thanks RT.

## 2023-02-24 NOTE — TELEPHONE ENCOUNTER
FYI- Patients wife notified.  She states that they are going to try and pay for the Steglatro and will call back if they have any issues.

## 2023-08-01 ENCOUNTER — OFFICE VISIT (OUTPATIENT)
Dept: ENDOCRINOLOGY | Facility: CLINIC | Age: 48
End: 2023-08-01
Payer: COMMERCIAL

## 2023-08-01 VITALS
HEART RATE: 77 BPM | SYSTOLIC BLOOD PRESSURE: 120 MMHG | DIASTOLIC BLOOD PRESSURE: 74 MMHG | HEIGHT: 66 IN | OXYGEN SATURATION: 98 % | BODY MASS INDEX: 30.05 KG/M2 | WEIGHT: 187 LBS

## 2023-08-01 DIAGNOSIS — E11.65 TYPE 2 DIABETES MELLITUS WITH HYPERGLYCEMIA, WITHOUT LONG-TERM CURRENT USE OF INSULIN: Primary | ICD-10-CM

## 2023-08-01 DIAGNOSIS — E78.2 MIXED HYPERLIPIDEMIA: ICD-10-CM

## 2023-08-01 DIAGNOSIS — R74.8 ABNORMAL LIVER ENZYMES: ICD-10-CM

## 2023-08-01 LAB
EXPIRATION DATE: NORMAL
EXPIRATION DATE: NORMAL
GLUCOSE BLDC GLUCOMTR-MCNC: 118 MG/DL (ref 70–130)
HBA1C MFR BLD: 7 %
Lab: NORMAL
Lab: NORMAL

## 2023-08-01 RX ORDER — LOVASTATIN 20 MG/1
20 TABLET ORAL NIGHTLY
Qty: 90 TABLET | Refills: 2 | Status: SHIPPED | OUTPATIENT
Start: 2023-08-01

## 2023-08-01 RX ORDER — ERTUGLIFLOZIN 15 MG/1
15 TABLET, FILM COATED ORAL EVERY MORNING
Qty: 30 TABLET | Refills: 6 | Status: SHIPPED | OUTPATIENT
Start: 2023-08-01

## 2023-08-01 RX ORDER — GLIPIZIDE 5 MG/1
5 TABLET ORAL 2 TIMES DAILY
Qty: 180 TABLET | Refills: 2 | Status: SHIPPED | OUTPATIENT
Start: 2023-08-01

## 2023-08-01 RX ORDER — BLOOD SUGAR DIAGNOSTIC
STRIP MISCELLANEOUS
Qty: 100 EACH | Refills: 3 | Status: SHIPPED | OUTPATIENT
Start: 2023-08-01

## 2023-08-01 RX ORDER — LISINOPRIL 2.5 MG/1
2.5 TABLET ORAL DAILY
Qty: 90 TABLET | Refills: 2 | Status: SHIPPED | OUTPATIENT
Start: 2023-08-01

## 2023-08-02 LAB
ALBUMIN SERPL-MCNC: 4.5 G/DL (ref 3.5–5.2)
ALBUMIN/CREAT UR: 19 MG/G CREAT (ref 0–29)
ALBUMIN/GLOB SERPL: 1.4 G/DL
ALP SERPL-CCNC: 89 U/L (ref 39–117)
ALT SERPL-CCNC: 69 U/L (ref 1–41)
AST SERPL-CCNC: 38 U/L (ref 1–40)
BILIRUB SERPL-MCNC: 0.3 MG/DL (ref 0–1.2)
BUN SERPL-MCNC: 16 MG/DL (ref 6–20)
BUN/CREAT SERPL: 21.9 (ref 7–25)
CALCIUM SERPL-MCNC: 9.7 MG/DL (ref 8.6–10.5)
CHLORIDE SERPL-SCNC: 104 MMOL/L (ref 98–107)
CHOLEST SERPL-MCNC: 131 MG/DL (ref 0–200)
CO2 SERPL-SCNC: 26.8 MMOL/L (ref 22–29)
CREAT SERPL-MCNC: 0.73 MG/DL (ref 0.76–1.27)
CREAT UR-MCNC: 80.1 MG/DL
EGFRCR SERPLBLD CKD-EPI 2021: 112.9 ML/MIN/1.73
GLOBULIN SER CALC-MCNC: 3.3 GM/DL
GLUCOSE SERPL-MCNC: 139 MG/DL (ref 65–99)
HDLC SERPL-MCNC: 35 MG/DL (ref 40–60)
LDLC SERPL CALC-MCNC: 68 MG/DL (ref 0–100)
MICROALBUMIN UR-MCNC: 14.9 UG/ML
POTASSIUM SERPL-SCNC: 5 MMOL/L (ref 3.5–5.2)
PROT SERPL-MCNC: 7.8 G/DL (ref 6–8.5)
SODIUM SERPL-SCNC: 144 MMOL/L (ref 136–145)
TRIGL SERPL-MCNC: 162 MG/DL (ref 0–150)
TSH SERPL DL<=0.005 MIU/L-ACNC: 1.19 UIU/ML (ref 0.27–4.2)
VLDLC SERPL CALC-MCNC: 28 MG/DL (ref 5–40)

## 2023-12-21 ENCOUNTER — OFFICE VISIT (OUTPATIENT)
Dept: ENDOCRINOLOGY | Facility: CLINIC | Age: 48
End: 2023-12-21
Payer: COMMERCIAL

## 2023-12-21 VITALS
WEIGHT: 191 LBS | DIASTOLIC BLOOD PRESSURE: 68 MMHG | HEART RATE: 84 BPM | SYSTOLIC BLOOD PRESSURE: 122 MMHG | HEIGHT: 66 IN | BODY MASS INDEX: 30.7 KG/M2

## 2023-12-21 DIAGNOSIS — E11.65 TYPE 2 DIABETES MELLITUS WITH HYPERGLYCEMIA, WITHOUT LONG-TERM CURRENT USE OF INSULIN: Primary | ICD-10-CM

## 2023-12-21 DIAGNOSIS — R74.8 ABNORMAL LIVER ENZYMES: ICD-10-CM

## 2023-12-21 PROCEDURE — 99214 OFFICE O/P EST MOD 30 MIN: CPT | Performed by: PHYSICIAN ASSISTANT

## 2023-12-21 RX ORDER — EMPAGLIFLOZIN 25 MG/1
25 TABLET, FILM COATED ORAL DAILY
Qty: 30 TABLET | Refills: 6 | Status: SHIPPED | OUTPATIENT
Start: 2023-12-21

## 2023-12-21 RX ORDER — EMPAGLIFLOZIN 25 MG/1
25 TABLET, FILM COATED ORAL DAILY
COMMUNITY
Start: 2023-11-20 | End: 2023-12-21 | Stop reason: SDUPTHER

## 2023-12-21 NOTE — PROGRESS NOTES
"     Office Note      Date: 2023  Patient Name: Thony Lilly  MRN: [id number]  : 1975    Chief Complaint   Patient presents with    Diabetes       History of Present Illness:   Thony Lilly is a 48 y.o. male who presents for follow-up for type 2 diabetes diagnosed in .  He remains on glipizide 5 mg 2 tablets daily, metformin 1000 mg twice a day and Jardiance 25 mg daily.  He reports he had his hemoglobin A1c completed last month with his primary care physician and was up some at 7.5%.  He reports he has been enjoying the holiday eating and needs to get back on track.  He denies any trouble with hypoglycemia.  He is up-to-date on his eye exam he had this appointment on Tuesday and all was okay.  He denies any trouble with his feet today.  We did his foot exam at his appointment in August as well as fasting labs.  He has twin grandbabies coming in January.    Subjective     Review of Systems:   Review of Systems   Constitutional: Negative.    Cardiovascular: Negative.    Gastrointestinal: Negative.    Endocrine: Negative.    Neurological: Negative.        The following portions of the patient's history were reviewed and updated as appropriate: allergies, current medications, past family history, past medical history, past social history, past surgical history, and problem list.    Objective     Vitals:    23 1538   BP: 122/68   Pulse: 84   Weight: 86.6 kg (191 lb)   Height: 167.6 cm (65.98\")     Body mass index is 30.84 kg/m².    Physical Exam    HEMOGLOBIN A1C  Lab Results   Component Value Date    HGBA1C 7.0 2023       GLUCOSE  Glucose   Date Value Ref Range Status   2023 118 70 - 130 mg/dL Final       CMP  Lab Results   Component Value Date    GLUCOSE 139 (H) 2023    BUN 16 2023    CREATININE 0.73 (L) 2023    EGFRIFNONA 113 2021    EGFRIFAFRI 131 2021    BCR 21.9 2023    K 5.0 2023    CO2 26.8 2023    CALCIUM 9.7 2023    " PROTENTOTREF 7.8 08/01/2023    LABIL2 1.4 08/01/2023    AST 38 08/01/2023    ALT 69 (H) 08/01/2023       LIPID PANEL  Lab Results   Component Value Date    CHLPL 131 08/01/2023    TRIG 162 (H) 08/01/2023    HDL 35 (L) 08/01/2023    LDL 68 08/01/2023       URINE MICROALBUMIN/CREATININE RATIO  Microalbumin/Creatinine Ratio   Date Value Ref Range Status   08/01/2023 19 0 - 29 mg/g creat Final     Comment:                            Normal:                0 -  29                         Moderately increased: 30 - 300                         Severely increased:       >300         TSH  Lab Results   Component Value Date    TSH 1.190 08/01/2023       Assessment / Plan      Assessment & Plan:  1. Type 2 diabetes mellitus with hyperglycemia, without long-term current use of insulin  His hemoglobin A1c was up some in November at 7.5%.  This is above goal.  For now we will continue his current medications glipizide 5 mg 2 tablets daily, metformin 1000 mg twice a day and Jardiance 25 mg daily and he will work on healthier eating habits.  I refilled his Jardiance today.  His weight is up 4 pounds since his appointment in August.  I encouraged healthy eating habits and physical activity as tolerated.  His blood pressure is okay today.    2. Abnormal liver enzymes  His ALT was elevated on labs in August.  Will plan to recheck his liver enzymes next visit for monitoring.  He does have a history of hepatitis in the past.      Return in about 4 months (around 4/21/2024) for Recheck.     This note was dictated using Dragon voice recognition.    Katiana Sainz PA-C  12/21/2023

## 2024-01-19 ENCOUNTER — TELEPHONE (OUTPATIENT)
Dept: ENDOCRINOLOGY | Facility: CLINIC | Age: 49
End: 2024-01-19
Payer: COMMERCIAL

## 2024-03-26 RX ORDER — LOVASTATIN 20 MG/1
TABLET ORAL
Qty: 90 TABLET | Refills: 1 | Status: SHIPPED | OUTPATIENT
Start: 2024-03-26

## 2024-03-26 NOTE — TELEPHONE ENCOUNTER
Rx Refill Note  Requested Prescriptions     Pending Prescriptions Disp Refills    lovastatin (MEVACOR) 20 MG tablet [Pharmacy Med Name: LOVASTATIN 20 MG TABLET] 90 tablet 2     Sig: DUSTIN TUSHAR TABLETA POR VIA ORAL TUSHAR BRYAN          Last office visit with prescribing clinician: 12/21/2023     Next office visit with prescribing clinician: 5/6/2024         Disha Odell MA  03/26/24, 14:45 EDT

## 2024-05-06 ENCOUNTER — OFFICE VISIT (OUTPATIENT)
Dept: ENDOCRINOLOGY | Facility: CLINIC | Age: 49
End: 2024-05-06
Payer: COMMERCIAL

## 2024-05-06 VITALS
HEART RATE: 83 BPM | OXYGEN SATURATION: 98 % | HEIGHT: 65 IN | SYSTOLIC BLOOD PRESSURE: 120 MMHG | DIASTOLIC BLOOD PRESSURE: 76 MMHG | BODY MASS INDEX: 31.16 KG/M2 | WEIGHT: 187 LBS

## 2024-05-06 DIAGNOSIS — R74.8 ABNORMAL LIVER ENZYMES: ICD-10-CM

## 2024-05-06 DIAGNOSIS — E11.65 TYPE 2 DIABETES MELLITUS WITH HYPERGLYCEMIA, WITHOUT LONG-TERM CURRENT USE OF INSULIN: Primary | ICD-10-CM

## 2024-05-06 DIAGNOSIS — E78.2 MIXED HYPERLIPIDEMIA: ICD-10-CM

## 2024-05-06 LAB
EXPIRATION DATE: ABNORMAL
EXPIRATION DATE: NORMAL
GLUCOSE BLDC GLUCOMTR-MCNC: 72 MG/DL (ref 70–130)
HBA1C MFR BLD: 7.4 % (ref 4.5–5.7)
Lab: ABNORMAL
Lab: NORMAL

## 2024-05-06 PROCEDURE — 83036 HEMOGLOBIN GLYCOSYLATED A1C: CPT | Performed by: PHYSICIAN ASSISTANT

## 2024-05-06 PROCEDURE — 82947 ASSAY GLUCOSE BLOOD QUANT: CPT | Performed by: PHYSICIAN ASSISTANT

## 2024-05-06 PROCEDURE — 99214 OFFICE O/P EST MOD 30 MIN: CPT | Performed by: PHYSICIAN ASSISTANT

## 2024-05-06 RX ORDER — EMPAGLIFLOZIN 25 MG/1
25 TABLET, FILM COATED ORAL DAILY
Qty: 90 TABLET | Refills: 2 | Status: SHIPPED | OUTPATIENT
Start: 2024-05-06

## 2024-05-06 RX ORDER — LISINOPRIL 2.5 MG/1
2.5 TABLET ORAL DAILY
Qty: 90 TABLET | Refills: 2 | Status: SHIPPED | OUTPATIENT
Start: 2024-05-06

## 2024-05-06 RX ORDER — GLIPIZIDE 5 MG/1
5 TABLET ORAL 2 TIMES DAILY
Qty: 180 TABLET | Refills: 2 | Status: SHIPPED | OUTPATIENT
Start: 2024-05-06

## 2024-05-06 RX ORDER — LOVASTATIN 20 MG/1
20 TABLET ORAL NIGHTLY
Qty: 90 TABLET | Refills: 2 | Status: SHIPPED | OUTPATIENT
Start: 2024-05-06

## 2024-10-07 ENCOUNTER — OFFICE VISIT (OUTPATIENT)
Dept: ENDOCRINOLOGY | Facility: CLINIC | Age: 49
End: 2024-10-07
Payer: COMMERCIAL

## 2024-10-07 VITALS
HEIGHT: 65 IN | HEART RATE: 60 BPM | SYSTOLIC BLOOD PRESSURE: 140 MMHG | DIASTOLIC BLOOD PRESSURE: 86 MMHG | OXYGEN SATURATION: 99 % | WEIGHT: 185 LBS | BODY MASS INDEX: 30.82 KG/M2

## 2024-10-07 DIAGNOSIS — R74.8 ABNORMAL LIVER ENZYMES: ICD-10-CM

## 2024-10-07 DIAGNOSIS — E11.65 TYPE 2 DIABETES MELLITUS WITH HYPERGLYCEMIA, WITHOUT LONG-TERM CURRENT USE OF INSULIN: Primary | ICD-10-CM

## 2024-10-07 DIAGNOSIS — E78.2 MIXED HYPERLIPIDEMIA: ICD-10-CM

## 2024-10-07 LAB
ALBUMIN SERPL-MCNC: 4.4 G/DL (ref 3.5–5.2)
ALBUMIN UR-MCNC: 1.6 MG/DL
ALBUMIN/GLOB SERPL: 1.6 G/DL
ALP SERPL-CCNC: 83 U/L (ref 39–117)
ALT SERPL W P-5'-P-CCNC: 66 U/L (ref 1–41)
ANION GAP SERPL CALCULATED.3IONS-SCNC: 10.2 MMOL/L (ref 5–15)
AST SERPL-CCNC: 35 U/L (ref 1–40)
BILIRUB SERPL-MCNC: 0.4 MG/DL (ref 0–1.2)
BUN SERPL-MCNC: 19 MG/DL (ref 6–20)
BUN/CREAT SERPL: 25.3 (ref 7–25)
CALCIUM SPEC-SCNC: 8.9 MG/DL (ref 8.6–10.5)
CHLORIDE SERPL-SCNC: 106 MMOL/L (ref 98–107)
CHOLEST SERPL-MCNC: 115 MG/DL (ref 0–200)
CO2 SERPL-SCNC: 24.8 MMOL/L (ref 22–29)
CREAT SERPL-MCNC: 0.75 MG/DL (ref 0.76–1.27)
CREAT UR-MCNC: 104.9 MG/DL
EGFRCR SERPLBLD CKD-EPI 2021: 110.6 ML/MIN/1.73
EXPIRATION DATE: ABNORMAL
EXPIRATION DATE: ABNORMAL
GLOBULIN UR ELPH-MCNC: 2.8 GM/DL
GLUCOSE BLDC GLUCOMTR-MCNC: 131 MG/DL (ref 70–130)
GLUCOSE SERPL-MCNC: 124 MG/DL (ref 65–99)
HBA1C MFR BLD: 7.5 % (ref 4.5–5.7)
HDLC SERPL-MCNC: 29 MG/DL (ref 40–60)
LDLC SERPL CALC-MCNC: 70 MG/DL (ref 0–100)
LDLC/HDLC SERPL: 2.42 {RATIO}
Lab: ABNORMAL
Lab: ABNORMAL
MICROALBUMIN/CREAT UR: 15.3 MG/G (ref 0–29)
POTASSIUM SERPL-SCNC: 4.4 MMOL/L (ref 3.5–5.2)
PROT SERPL-MCNC: 7.2 G/DL (ref 6–8.5)
SODIUM SERPL-SCNC: 141 MMOL/L (ref 136–145)
TRIGL SERPL-MCNC: 79 MG/DL (ref 0–150)
TSH SERPL DL<=0.05 MIU/L-ACNC: 1.37 UIU/ML (ref 0.27–4.2)
VLDLC SERPL-MCNC: 16 MG/DL (ref 5–40)

## 2024-10-07 PROCEDURE — 82043 UR ALBUMIN QUANTITATIVE: CPT | Performed by: PHYSICIAN ASSISTANT

## 2024-10-07 PROCEDURE — 84443 ASSAY THYROID STIM HORMONE: CPT | Performed by: PHYSICIAN ASSISTANT

## 2024-10-07 PROCEDURE — 99214 OFFICE O/P EST MOD 30 MIN: CPT | Performed by: PHYSICIAN ASSISTANT

## 2024-10-07 PROCEDURE — 80053 COMPREHEN METABOLIC PANEL: CPT | Performed by: PHYSICIAN ASSISTANT

## 2024-10-07 PROCEDURE — 80061 LIPID PANEL: CPT | Performed by: PHYSICIAN ASSISTANT

## 2024-10-07 PROCEDURE — 82570 ASSAY OF URINE CREATININE: CPT | Performed by: PHYSICIAN ASSISTANT

## 2024-10-07 PROCEDURE — 83036 HEMOGLOBIN GLYCOSYLATED A1C: CPT | Performed by: PHYSICIAN ASSISTANT

## 2024-10-07 PROCEDURE — 82947 ASSAY GLUCOSE BLOOD QUANT: CPT | Performed by: PHYSICIAN ASSISTANT

## 2024-10-07 RX ORDER — LISINOPRIL 2.5 MG/1
2.5 TABLET ORAL DAILY
Qty: 90 TABLET | Refills: 2 | Status: SHIPPED | OUTPATIENT
Start: 2024-10-07

## 2024-10-07 RX ORDER — GLIPIZIDE 10 MG/1
TABLET ORAL
Qty: 135 TABLET | Refills: 1 | Status: SHIPPED | OUTPATIENT
Start: 2024-10-07

## 2024-10-07 RX ORDER — LOVASTATIN 20 MG
20 TABLET ORAL NIGHTLY
Qty: 90 TABLET | Refills: 2 | Status: SHIPPED | OUTPATIENT
Start: 2024-10-07

## 2024-10-07 RX ORDER — BLOOD SUGAR DIAGNOSTIC
STRIP MISCELLANEOUS
Qty: 100 EACH | Refills: 3 | Status: SHIPPED | OUTPATIENT
Start: 2024-10-07

## 2024-10-07 RX ORDER — EMPAGLIFLOZIN 25 MG/1
25 TABLET, FILM COATED ORAL DAILY
Qty: 90 TABLET | Refills: 2 | Status: SHIPPED | OUTPATIENT
Start: 2024-10-07

## 2024-10-07 NOTE — PROGRESS NOTES
"     Office Note      Date: 10/07/2024  Patient Name: Thony Lilly  MRN: [id number]  : 1975    Chief Complaint   Patient presents with    Diabetes     Type II    Hyperlipidemia    Obesity       History of Present Illness:   Thony Lilly is a 49 y.o. male who presents for follow-up for type 2 diabetes diagnosed .  He remains on glipizide 5 mg 1 tablet twice a day, metformin 1000 mg twice a day and Jardiance 25 mg daily.  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  He is not surprised his hemoglobin A1c is up some today.  He is up-to-date on his eye exam he has annual appointment in December.  He is fasting today for labs.  He has been taking the lovastatin 20 mg regularly as well as the lisinopril 2.5 mg daily for kidney protection.  He denies any trouble with his feet today.  His twin grand babies are doing well and he plans to go to [place] for a visit in the near future.    Subjective     Review of Systems:   Review of Systems   Constitutional: Negative.    Cardiovascular: Negative.    Gastrointestinal: Negative.    Endocrine: Negative.    Neurological: Negative.        The following portions of the patient's history were reviewed and updated as appropriate: allergies, current medications, past family history, past medical history, past social history, past surgical history, and problem list.    Objective     Vitals:    10/07/24 0907   BP: 140/86   BP Location: Left arm   Patient Position: Sitting   Cuff Size: Adult   Pulse: 60   SpO2: 99%   Weight: 83.9 kg (185 lb)   Height: 165.1 cm (65\")     Body mass index is 30.79 kg/m².    Physical Exam  Vitals reviewed.   Constitutional:       General: He is not in acute distress.     Appearance: Normal appearance.   Cardiovascular:      Pulses:           Dorsalis pedis pulses are 2+ on the right side and 2+ on the left side.        Posterior tibial pulses are 2+ on the right side and 2+ on the left side.   Musculoskeletal: "      Right foot: No deformity.      Left foot: No deformity.   Feet:      Right foot:      Protective Sensation: 5 sites tested.  5 sites sensed.      Skin integrity: Skin integrity normal.      Toenail Condition: Right toenails are abnormally thick.      Left foot:      Protective Sensation: 5 sites tested.  5 sites sensed.      Skin integrity: Skin integrity normal.      Toenail Condition: Left toenails are abnormally thick.      Comments: Thickened discolored great nails noted.  Neurological:      Mental Status: He is alert.         HEMOGLOBIN A1C  Lab Results   Component Value Date    HGBA1C 7.5 (A) 10/07/2024       GLUCOSE  Glucose   Date Value Ref Range Status   10/07/2024 131 (A) 70 - 130 mg/dL Final       CMP  Lab Results   Component Value Date    GLUCOSE 139 (H) 08/01/2023    BUN 16 08/01/2023    CREATININE 0.73 (L) 08/01/2023    EGFRIFNONA 113 05/17/2021    EGFRIFAFRI 131 05/17/2021    BCR 21.9 08/01/2023    K 5.0 08/01/2023    CO2 26.8 08/01/2023    CALCIUM 9.7 08/01/2023    PROTENTOTREF 7.8 08/01/2023    LABIL2 1.4 08/01/2023    AST 38 08/01/2023    ALT 69 (H) 08/01/2023       LIPID PANEL  Lab Results   Component Value Date    CHLPL 131 08/01/2023    TRIG 162 (H) 08/01/2023    HDL 35 (L) 08/01/2023    LDL 68 08/01/2023       URINE MICROALBUMIN/CREATININE RATIO  Microalbumin/Creatinine Ratio   Date Value Ref Range Status   08/01/2023 19 0 - 29 mg/g creat Final     Comment:                            Normal:                0 -  29                         Moderately increased: 30 - 300                         Severely increased:       >300         TSH  Lab Results   Component Value Date    TSH 1.190 08/01/2023       Assessment / Plan      Assessment & Plan:  1. Type 2 diabetes mellitus with hyperglycemia, without long-term current use of insulin  His hemoglobin A1c is up slightly as compared to May at 7.5%.  This is above goal.  We will try increasing his glipizide.  We will increase his dose to 10 mg in  the morning and 5 mg in the evening.  He will continue metformin 1000 mg twice a day and Jardiance 25 mg daily.  I refilled these prescriptions as well as his testing supplies today.  His blood pressure is elevated today.  He reports this is typically not a problem for him.  He will monitor this at home and we discussed increasing his lisinopril if this remains above 130/80.  He will reach out with readings.  Fasting labs pending today.  Will send note with results and plan.  His weight is down 2 pounds since his appointment in May.  I encouraged healthy eating habits and physical activity as tolerated.  - POC Glucose, Blood  - POC Glycosylated Hemoglobin (Hb A1C)  - TSH; Future  - Comprehensive Metabolic Panel; Future  - Lipid Panel; Future  - Microalbumin / Creatinine Urine Ratio - Urine, Clean Catch; Future  - TSH  - Comprehensive Metabolic Panel  - Lipid Panel  - Microalbumin / Creatinine Urine Ratio - Urine, Clean Catch    2. Mixed hyperlipidemia  Fasting labs pending today.  Will send note with results and plan.  For now he will continue the lovastatin 20 mg daily.  I refilled this prescription today.  - Lipid Panel; Future  - Lipid Panel    3. Abnormal liver enzymes  CMP pending today.  Will send note with results and plan.  He does have a history of hepatitis A.  - Comprehensive Metabolic Panel; Future  - Comprehensive Metabolic Panel      Return in about 5 months (around 3/7/2025) for Recheck.     This note was dictated using Dragon voice recognition.    Electronically signed by: CHICA Arroyo  10/07/2024

## 2025-03-17 ENCOUNTER — OFFICE VISIT (OUTPATIENT)
Dept: ENDOCRINOLOGY | Facility: CLINIC | Age: 50
End: 2025-03-17
Payer: COMMERCIAL

## 2025-03-17 VITALS
OXYGEN SATURATION: 98 % | DIASTOLIC BLOOD PRESSURE: 75 MMHG | HEART RATE: 71 BPM | SYSTOLIC BLOOD PRESSURE: 120 MMHG | HEIGHT: 65 IN | BODY MASS INDEX: 31.49 KG/M2 | WEIGHT: 189 LBS

## 2025-03-17 DIAGNOSIS — E11.65 TYPE 2 DIABETES MELLITUS WITH HYPERGLYCEMIA, WITHOUT LONG-TERM CURRENT USE OF INSULIN: Primary | ICD-10-CM

## 2025-03-17 DIAGNOSIS — E78.2 MIXED HYPERLIPIDEMIA: ICD-10-CM

## 2025-03-17 LAB
EXPIRATION DATE: ABNORMAL
EXPIRATION DATE: ABNORMAL
GLUCOSE BLDC GLUCOMTR-MCNC: 136 MG/DL (ref 70–130)
HBA1C MFR BLD: 8.2 % (ref 4.5–5.7)
Lab: ABNORMAL
Lab: ABNORMAL

## 2025-03-17 RX ORDER — LOVASTATIN 20 MG/1
20 TABLET ORAL NIGHTLY
Qty: 90 TABLET | Refills: 2 | Status: SHIPPED | OUTPATIENT
Start: 2025-03-17

## 2025-03-17 RX ORDER — LISINOPRIL 2.5 MG/1
2.5 TABLET ORAL DAILY
Qty: 90 TABLET | Refills: 2 | Status: SHIPPED | OUTPATIENT
Start: 2025-03-17

## 2025-03-17 RX ORDER — TIRZEPATIDE 5 MG/.5ML
5 INJECTION, SOLUTION SUBCUTANEOUS WEEKLY
Qty: 2 ML | Refills: 4 | Status: SHIPPED | OUTPATIENT
Start: 2025-03-17

## 2025-03-17 RX ORDER — EMPAGLIFLOZIN 25 MG/1
25 TABLET, FILM COATED ORAL DAILY
Qty: 90 TABLET | Refills: 2 | Status: SHIPPED | OUTPATIENT
Start: 2025-03-17

## 2025-03-17 RX ORDER — GLIPIZIDE 5 MG/1
10 TABLET ORAL DAILY
Qty: 180 TABLET | Refills: 2 | Status: SHIPPED | OUTPATIENT
Start: 2025-03-17

## 2025-03-17 NOTE — PROGRESS NOTES
"     Office Note      Date: 2025  Patient Name: Thony Lilly  MRN: [id number]  : 1975    Chief Complaint   Patient presents with    Diabetes       History of Present Illness:   Thony Lilly is a 49 y.o. male who presents for follow-up for type 2 diabetes diagnosed in .  He remains on metformin 1000 mg twice a day, Jardiance 25 mg daily and glipizide 10 mg twice a day.  He has been on the increased glipizide dose for at least a month.  He reports his blood sugars have been elevated.  He has been to [place] twice since his last visit.  He is up-to-date on his eye exam he goes annually in December.  He denies any trouble with his feet today.  We did his foot exam as well as fasting labs last visit.      Subjective     Review of Systems:   Review of Systems   Constitutional: Negative.    Cardiovascular: Negative.    Gastrointestinal: Negative.    Endocrine: Negative.    Neurological: Negative.        The following portions of the patient's history were reviewed and updated as appropriate: allergies, current medications, past family history, past medical history, past social history, past surgical history, and problem list.    Objective     Vitals:    25 1021   BP: 120/75   Pulse: 71   SpO2: 98%   Weight: 85.7 kg (189 lb)   Height: 165.1 cm (65\")     Body mass index is 31.45 kg/m².    Physical Exam  Vitals reviewed.   Constitutional:       General: He is not in acute distress.     Appearance: Normal appearance.   Neurological:      Mental Status: He is alert.         HEMOGLOBIN A1C  Lab Results   Component Value Date    HGBA1C 8.2 (A) 2025       GLUCOSE  Glucose   Date Value Ref Range Status   2025 136 (A) 70 - 130 mg/dL Final       CMP  Lab Results   Component Value Date    GLUCOSE 124 (H) 10/07/2024    BUN 19 10/07/2024    CREATININE 0.75 (L) 10/07/2024    EGFRIFNONA 113 2021    EGFRIFAFRI 131 2021    BCR 25.3 (H) 10/07/2024    K 4.4 10/07/2024    CO2 24.8 10/07/2024 "    CALCIUM 8.9 10/07/2024    AST 35 10/07/2024    ALT 66 (H) 10/07/2024       LIPID PANEL  Lab Results   Component Value Date    CHOL 115 10/07/2024    CHLPL 131 08/01/2023    TRIG 79 10/07/2024    HDL 29 (L) 10/07/2024    LDL 70 10/07/2024       URINE MICROALBUMIN/CREATININE RATIO  Microalbumin/Creatinine Ratio   Date Value Ref Range Status   10/07/2024 15.3 0.0 - 29.0 mg/g Final       TSH  Lab Results   Component Value Date    TSH 1.370 10/07/2024       Assessment / Plan      Assessment & Plan:  1. Type 2 diabetes mellitus with hyperglycemia, without long-term current use of insulin  His hemoglobin A1c is up as compared to October at 8.2%.  This is above goal.  We discussed the importance of getting his diabetes under better control.  He is willing to consider a new medication.  We will try adding Mounjaro 2.5 mg weekly.  I gave him a sample for 1 month of this medication he will try it.  We discussed taking the 2.5 mg once weekly for 4 weeks and then increasing to the 5 mg weekly dose.  I sent a prescription for the Mounjaro 5 mg to his pharmacy.  We discussed the possible side effects.  He will reach out if he has any trouble.  When he starts the Mounjaro he will reduce his glipizide to 10 mg once a day.  When he increases to the 5 mg weekly dose we will reduce his glipizide to 5 mg daily.  He will continue metformin 1000 mg twice a day and Jardiance 25 mg daily the same.  I refilled his prescriptions today.  His weight is up 4 pounds since his appointment in October.  I encouraged healthy eating habits and physical activity as tolerated.  His blood pressure is okay today.  He will continue lisinopril 2.5 mg daily for kidney protection.  I refilled this prescription today.  - POC Glucose, Blood  - POC Glycosylated Hemoglobin (Hb A1C)    2. Mixed hyperlipidemia  His cholesterol numbers were okay last visit.  He will continue the lovastatin 20 mg nightly.  I refilled this prescription today.      Return in about 4  months (around 7/17/2025) for Recheck.     This note was dictated using Dragon voice recognition.    Electronically signed by: CHICA Arroyo  03/17/2025

## 2025-03-17 NOTE — PATIENT INSTRUCTIONS
Add Mounjaro 2.5 mg weekly-- reduce glipizide to 10 mg one tablet daily when starting mounjaro.  After 4 weeks increase mounjaro to 5 mg weekly and reduce glipizide to 5 mg daily.    Continue Jardiance 25 mg daily  and Metformin 1000mg twice a day

## 2025-06-17 ENCOUNTER — TELEPHONE (OUTPATIENT)
Dept: ENDOCRINOLOGY | Facility: CLINIC | Age: 50
End: 2025-06-17

## 2025-06-17 DIAGNOSIS — E11.65 TYPE 2 DIABETES MELLITUS WITH HYPERGLYCEMIA, WITHOUT LONG-TERM CURRENT USE OF INSULIN: Primary | ICD-10-CM

## 2025-06-17 RX ORDER — TIRZEPATIDE 2.5 MG/.5ML
2.5 INJECTION, SOLUTION SUBCUTANEOUS WEEKLY
Qty: 2 ML | Refills: 3 | Status: SHIPPED | OUTPATIENT
Start: 2025-06-17

## 2025-06-17 NOTE — TELEPHONE ENCOUNTER
PATIENT HAS NOT BEEN TOLERATING THE NEW DOSAGE OF MOUNARJO 5 MG. HE DID FINE WITH THE MOUNJARO 2.5 MG. PATIENTS WANTS TO SEE IF HE CAN GO BACK DOWN THE THE 2.5 MG. PHONE NUMBER [phone]

## 2025-08-11 ENCOUNTER — OFFICE VISIT (OUTPATIENT)
Dept: ENDOCRINOLOGY | Facility: CLINIC | Age: 50
End: 2025-08-11
Payer: COMMERCIAL

## 2025-08-11 VITALS
BODY MASS INDEX: 28.36 KG/M2 | HEIGHT: 65 IN | SYSTOLIC BLOOD PRESSURE: 118 MMHG | DIASTOLIC BLOOD PRESSURE: 68 MMHG | OXYGEN SATURATION: 98 % | WEIGHT: 170.2 LBS | HEART RATE: 67 BPM

## 2025-08-11 DIAGNOSIS — E78.2 MIXED HYPERLIPIDEMIA: ICD-10-CM

## 2025-08-11 DIAGNOSIS — E11.65 TYPE 2 DIABETES MELLITUS WITH HYPERGLYCEMIA, WITHOUT LONG-TERM CURRENT USE OF INSULIN: Primary | ICD-10-CM

## 2025-08-11 LAB
EXPIRATION DATE: ABNORMAL
EXPIRATION DATE: NORMAL
GLUCOSE BLDC GLUCOMTR-MCNC: 110 MG/DL (ref 70–130)
HBA1C MFR BLD: 6 % (ref 4.5–5.7)
Lab: ABNORMAL
Lab: NORMAL

## 2025-08-11 RX ORDER — TIRZEPATIDE 2.5 MG/.5ML
2.5 INJECTION, SOLUTION SUBCUTANEOUS WEEKLY
Qty: 2 ML | Refills: 3 | Status: SHIPPED | OUTPATIENT
Start: 2025-08-11

## 2025-08-11 RX ORDER — BLOOD SUGAR DIAGNOSTIC
STRIP MISCELLANEOUS
Qty: 100 EACH | Refills: 3 | Status: SHIPPED | OUTPATIENT
Start: 2025-08-11

## 2025-08-11 RX ORDER — LISINOPRIL 2.5 MG/1
2.5 TABLET ORAL DAILY
Qty: 90 TABLET | Refills: 2 | Status: SHIPPED | OUTPATIENT
Start: 2025-08-11

## 2025-08-11 RX ORDER — LOVASTATIN 20 MG/1
20 TABLET ORAL NIGHTLY
Qty: 90 TABLET | Refills: 2 | Status: SHIPPED | OUTPATIENT
Start: 2025-08-11